# Patient Record
(demographics unavailable — no encounter records)

---

## 2019-12-12 NOTE — CT
EXAM:

CT Brain WO Con



PROVIDED CLINICAL HISTORY:

Head injury



COMPARISON:

None



FINDINGS:

The ventricular system is normal in size and morphology. There are scattered foci of acute subarachno
id hemorrhage involving both cerebral hemispheres. There is a small right anterior parafalcine

subdural hematoma. There is a small hemorrhagic cortical contusion involving the right temporal regio
n. There is no shift of the midline structures. The basilar cisterns appear patent. The

extracranial soft tissues and osseous structures demonstrate no evidence for an acute abnormality.



IMPRESSION:

Acute subarachnoid hemorrhage, hemorrhagic cortical contusion and subdural hematoma as described. Fin
dings communicated to the referring clinician 5:31 PM 12/12/2019.



Reported By: Matti Dougherty 

Electronically Signed:  12/12/2019 5:32 PM
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
## 2019-12-12 NOTE — CT
EXAM:

CT cervical spine



PROVIDED CLINICAL HISTORY:

Trauma



COMPARISON:

None



FINDINGS:

No evidence for fracture or traumatic subluxation.  No prevertebral soft tissue swelling apparent. Vi
sualized lung apices appear clear. Cervical degenerative changes are noted. Vascular calcifications

are seen.



IMPRESSION:

No evidence for fracture or traumatic subluxation.



Reported By: Matti Dougherty 

Electronically Signed:  12/12/2019 5:35 PM

## 2019-12-12 NOTE — RAD
EXAM:

Portable chest



PROVIDED CLINICAL HISTORY:

Altered mental status



COMPARISON:

7/24/2019



FINDINGS:

Cardiac and mediastinal silhouette is within normal limits. No focal consolidation, pleural fluid or 
pneumothorax evident. Vascular calcification involves the aortic arch.



IMPRESSION:

No evidence for an acute cardiopulmonary process.



Reported By: Matti Dougherty 

Electronically Signed:  12/12/2019 5:33 PM
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
## 2020-01-27 NOTE — CT
HEAD CT WITHOUT CONTRAST:

 

HISTORY: 

Multifocal subarachnoid hemorrhage.  Followup exam.

 

COMPARISON: 

12/13/2019.

 

FINDINGS: 

Interval resolution of previously noted subarachnoid hemorrhage.  Currently, no extraaxial hematoma. 
 No parenchymal hemorrhage.  No midline shift.  Basilar cisterns are patent.  Brain volume, age appro
priate.  Cortical gray-white matter differentiation is preserved.  No hydrocephalus.  Minimal white m
atter hypodensities or chronic small-vessel ischemic change.

 

Adequate aeration of the mastoid air cells.  Partial opacification of the right sphenoid sinus, simil
ar to the previous examination.  Intact calvarium.

 

IMPRESSION: 

Interval resolution of previously noted subarachnoid hemorrhage.

 

POS: [facility]